# Patient Record
Sex: MALE | Race: WHITE | NOT HISPANIC OR LATINO | URBAN - NONMETROPOLITAN AREA
[De-identification: names, ages, dates, MRNs, and addresses within clinical notes are randomized per-mention and may not be internally consistent; named-entity substitution may affect disease eponyms.]

---

## 2019-07-24 ENCOUNTER — IMPORTED ENCOUNTER (OUTPATIENT)
Dept: URBAN - NONMETROPOLITAN AREA CLINIC 1 | Facility: CLINIC | Age: 69
End: 2019-07-24

## 2019-07-24 PROBLEM — H26.491: Noted: 2019-07-24

## 2019-07-24 PROBLEM — Z96.1: Noted: 2018-07-18

## 2019-07-24 PROCEDURE — 92014 COMPRE OPH EXAM EST PT 1/>: CPT

## 2019-07-24 NOTE — PATIENT DISCUSSION
PSEUDOPHAKIA OUMONITOREarly PCO od-Explained symptoms of advancing PCO. -Continue to monitor for now. Pt will notify us if any new symptoms develop.; 's Notes: Loves baseball. Geraldine zamora (and Ivis zamora). Wilma zamora.

## 2019-12-09 ENCOUNTER — IMPORTED ENCOUNTER (OUTPATIENT)
Dept: URBAN - NONMETROPOLITAN AREA CLINIC 1 | Facility: CLINIC | Age: 69
End: 2019-12-09

## 2019-12-09 PROBLEM — Z96.1: Noted: 2019-12-09

## 2019-12-09 PROBLEM — H26.491: Noted: 2019-12-09

## 2019-12-09 PROBLEM — H43.812: Noted: 2019-12-09

## 2019-12-09 PROCEDURE — 92014 COMPRE OPH EXAM EST PT 1/>: CPT

## 2019-12-09 NOTE — PATIENT DISCUSSION
PVD OS-Retina flat 360 with no breaks tears or heme.-S&S of RD/RT reviewed with pt.-Stressed that pt should contact our office right away with any changes or increase in symptoms.; 's Notes: Loves baseball. Geraldine zamora (and Ivis fan). Wilma zamora.

## 2020-09-24 ENCOUNTER — IMPORTED ENCOUNTER (OUTPATIENT)
Dept: URBAN - NONMETROPOLITAN AREA CLINIC 1 | Facility: CLINIC | Age: 70
End: 2020-09-24

## 2020-09-24 PROBLEM — H26.493: Noted: 2021-10-11

## 2020-09-24 PROBLEM — Z96.1: Noted: 2019-12-09

## 2020-09-24 PROBLEM — H26.491: Noted: 2019-12-09

## 2020-09-24 PROBLEM — H43.813: Noted: 2020-09-24

## 2020-09-24 PROBLEM — H35.373: Noted: 2021-10-11

## 2020-09-24 PROBLEM — H52.4: Noted: 2021-10-11

## 2020-09-24 PROBLEM — Z96.1: Noted: 2021-10-11

## 2020-09-24 PROCEDURE — 92014 COMPRE OPH EXAM EST PT 1/>: CPT

## 2020-09-24 NOTE — PATIENT DISCUSSION
PVD OU-Retina flat 360 with no breaks tears or heme.-S&S of RD/RT reviewed with pt.-Stressed that pt should contact our office right away with any changes or increase in symptoms. s/p PCIOL-Stable PCIOL OU.-Monitor for PCO. -RTC . Early PCO-Explained symptoms of advancing PCO. -Continue to monitor for now. Pt will notify us if any new symptoms develop.; 's Notes: Loves baseball. Geraldine zamora (and Ivis zamora). Wilma zamora.

## 2021-02-09 ENCOUNTER — IMPORTED ENCOUNTER (OUTPATIENT)
Dept: URBAN - NONMETROPOLITAN AREA CLINIC 1 | Facility: CLINIC | Age: 71
End: 2021-02-09

## 2021-02-09 PROCEDURE — 92014 COMPRE OPH EXAM EST PT 1/>: CPT

## 2021-02-09 NOTE — PATIENT DISCUSSION
PVD OU +progression-Retina flat 360 with no breaks tears or heme.-S&S of RD/RT reviewed with pt.-Stressed that pt should contact our office right away with any changes or increase in symptoms. s/p PCIOL-Stable PCIOL OU.-Monitor for PCO. -RTC . Early PCO-Explained symptoms of advancing PCO. -Continue to monitor for now. Pt will notify us if any new symptoms develop.; 's Notes: Loves baseball. Geraldine zamora (and Ivis zamora). Wilma zamora.

## 2021-05-20 ENCOUNTER — IMPORTED ENCOUNTER (OUTPATIENT)
Dept: URBAN - NONMETROPOLITAN AREA CLINIC 1 | Facility: CLINIC | Age: 71
End: 2021-05-20

## 2021-05-20 PROCEDURE — 92014 COMPRE OPH EXAM EST PT 1/>: CPT

## 2021-05-20 NOTE — PATIENT DISCUSSION
PVD OU +progression-Retina flat 360 with no breaks tears or heme.-S&S of RD/RT reviewed with pt.-Stressed that pt should contact our office right away with any changes or increase in symptoms. REASSURE PT THAT THEY WILL RESOLVEs/p PCIOL-Stable PCIOL OU.-Monitor for PCO. -RTC . Early PCO-Explained symptoms of advancing PCO. -Continue to monitor for now. Pt will notify us if any new symptoms develop.; 's Notes: Loves baseball. Geraldine zamora (and Ivis zamora). Wilma zamora. DFE 5/20/2021

## 2021-10-11 ENCOUNTER — IMPORTED ENCOUNTER (OUTPATIENT)
Dept: URBAN - NONMETROPOLITAN AREA CLINIC 1 | Facility: CLINIC | Age: 71
End: 2021-10-11

## 2021-10-11 PROCEDURE — 92014 COMPRE OPH EXAM EST PT 1/>: CPT

## 2021-10-11 PROCEDURE — 92134 CPTRZ OPH DX IMG PST SGM RTA: CPT

## 2021-10-11 PROCEDURE — 92015 DETERMINE REFRACTIVE STATE: CPT

## 2021-10-11 NOTE — PATIENT DISCUSSION
PVD OU/ERM OU-Retina flat 360 with no breaks tears or heme.-S&S of RD/RT reviewed with pt.-Stressed that pt should contact our office right away with any changes or increase in symptoms.-Discussed referrral for PPV to remove floaters if patient desiress/p PCIOL-Stable PCIOL OU.-Monitor for PCO. Early PCO-Explained symptoms of advancing PCO. -Continue to monitor for now. Pt will notify us if any new symptoms develop. Presbyopia-Rx issued for glasses; 's Notes: Loves baseball. Geraldine zamora (and Ivis zamora). Wilma zamora. DFE 5/20/2021

## 2022-02-09 ENCOUNTER — EMERGENCY VISIT (OUTPATIENT)
Dept: RURAL CLINIC 1 | Facility: CLINIC | Age: 72
End: 2022-02-09

## 2022-02-09 DIAGNOSIS — H10.011: ICD-10-CM

## 2022-02-09 PROCEDURE — 99213 OFFICE O/P EST LOW 20 MIN: CPT

## 2022-02-09 ASSESSMENT — VISUAL ACUITY
OS_SC: 20/20-2
OD_SC: 20/20-1

## 2022-02-09 NOTE — PATIENT DISCUSSION
(Viral) The condition was discussed with the patient. Cool compresses recommended. The mechanism of transmission was explained, and the patient was educated to wash hands and use separate towels and bedding.

## 2022-04-09 ASSESSMENT — VISUAL ACUITY
OD_GLARE: 20/25
OS_CC: 20/20
OU_CC: 20/25-
OS_CC: 20/30+2
OD_CC: 20/40-2
OD_CC: 20/25
OS_GLARE: 20/20
OS_CC: 20/20
OS_CC: 20/25
OD_CC: 20/25-2
OD_CC: 20/40+
OU_CC: 20/20
OS_CC: 20/20
OS_CC: 20/20
OU_CC: 20/20
OD_CC: 20/25-2
OD_CC: 20/30+2

## 2022-04-09 ASSESSMENT — TONOMETRY
OS_IOP_MMHG: 15
OS_IOP_MMHG: 14
OS_IOP_MMHG: 15
OS_IOP_MMHG: 16
OD_IOP_MMHG: 15
OD_IOP_MMHG: 15
OS_IOP_MMHG: 15
OD_IOP_MMHG: 15

## 2022-07-08 ENCOUNTER — EMERGENCY VISIT (OUTPATIENT)
Dept: RURAL CLINIC 1 | Facility: CLINIC | Age: 72
End: 2022-07-08

## 2022-07-08 DIAGNOSIS — H43.813: ICD-10-CM

## 2022-07-08 DIAGNOSIS — H35.373: ICD-10-CM

## 2022-07-08 DIAGNOSIS — H26.493: ICD-10-CM

## 2022-07-08 DIAGNOSIS — Z96.1: ICD-10-CM

## 2022-07-08 PROCEDURE — 92014 COMPRE OPH EXAM EST PT 1/>: CPT

## 2022-07-08 ASSESSMENT — VISUAL ACUITY
OS_CC: 20/20
OD_CC: 20/25+2

## 2022-07-08 ASSESSMENT — TONOMETRY
OD_IOP_MMHG: 18
OS_IOP_MMHG: 18

## 2022-09-22 ENCOUNTER — EMERGENCY VISIT (OUTPATIENT)
Dept: RURAL CLINIC 1 | Facility: CLINIC | Age: 72
End: 2022-09-22

## 2022-09-22 DIAGNOSIS — Z96.1: ICD-10-CM

## 2022-09-22 DIAGNOSIS — H35.373: ICD-10-CM

## 2022-09-22 PROCEDURE — 92014 COMPRE OPH EXAM EST PT 1/>: CPT

## 2022-09-22 PROCEDURE — 92134 CPTRZ OPH DX IMG PST SGM RTA: CPT

## 2022-09-22 ASSESSMENT — TONOMETRY
OD_IOP_MMHG: 15
OS_IOP_MMHG: 15

## 2022-09-22 ASSESSMENT — VISUAL ACUITY
OS_CC: 20/25
OD_CC: 20/40
OS_SC: 20/30
OU_CC: 20/25
OU_SC: 20/30
OD_SC: 20/50

## 2022-12-05 ENCOUNTER — FOLLOW UP (OUTPATIENT)
Dept: RURAL CLINIC 1 | Facility: CLINIC | Age: 72
End: 2022-12-05

## 2022-12-05 PROCEDURE — 99213 OFFICE O/P EST LOW 20 MIN: CPT

## 2022-12-05 ASSESSMENT — VISUAL ACUITY
OU_SC: 20/20
OD_SC: 20/30+2
OS_SC: 20/25

## 2022-12-05 ASSESSMENT — TONOMETRY
OD_IOP_MMHG: 16
OS_IOP_MMHG: 15

## 2022-12-05 NOTE — PATIENT DISCUSSION
The patient has signs and symptoms consistent with (insert) nerve palsies. The condition and possible etiologies were discussed with the patient.

## 2023-10-17 ENCOUNTER — ESTABLISHED PATIENT (OUTPATIENT)
Dept: RURAL CLINIC 1 | Facility: CLINIC | Age: 73
End: 2023-10-17

## 2023-10-17 DIAGNOSIS — H35.373: ICD-10-CM

## 2023-10-17 DIAGNOSIS — H43.813: ICD-10-CM

## 2023-10-17 DIAGNOSIS — H49.11: ICD-10-CM

## 2023-10-17 PROCEDURE — 92014 COMPRE OPH EXAM EST PT 1/>: CPT

## 2023-10-17 ASSESSMENT — TONOMETRY
OS_IOP_MMHG: 16
OD_IOP_MMHG: 16

## 2023-10-17 ASSESSMENT — VISUAL ACUITY
OS_SC: 20/20-2
OD_SC: 20/25

## 2024-10-18 ENCOUNTER — COMPREHENSIVE EXAM (OUTPATIENT)
Dept: RURAL CLINIC 1 | Facility: CLINIC | Age: 74
End: 2024-10-18

## 2024-10-18 DIAGNOSIS — H49.11: ICD-10-CM

## 2024-10-18 DIAGNOSIS — H43.813: ICD-10-CM

## 2024-10-18 DIAGNOSIS — Z96.1: ICD-10-CM

## 2024-10-18 DIAGNOSIS — H35.373: ICD-10-CM

## 2024-10-18 DIAGNOSIS — H26.493: ICD-10-CM

## 2024-10-18 PROCEDURE — 92014 COMPRE OPH EXAM EST PT 1/>: CPT

## 2025-07-17 ENCOUNTER — CONTACT LENSES/GLASSES VISIT (OUTPATIENT)
Age: 75
End: 2025-07-17

## 2025-07-17 DIAGNOSIS — H26.493: ICD-10-CM

## 2025-07-17 DIAGNOSIS — H49.11: ICD-10-CM

## 2025-07-17 DIAGNOSIS — Z96.1: ICD-10-CM

## 2025-07-17 PROCEDURE — 92012 INTRM OPH EXAM EST PATIENT: CPT
